# Patient Record
Sex: MALE | Race: OTHER | HISPANIC OR LATINO | URBAN - METROPOLITAN AREA
[De-identification: names, ages, dates, MRNs, and addresses within clinical notes are randomized per-mention and may not be internally consistent; named-entity substitution may affect disease eponyms.]

---

## 2022-08-01 ENCOUNTER — INPATIENT (INPATIENT)
Facility: HOSPITAL | Age: 32
LOS: 0 days | Discharge: HOME | End: 2022-08-02
Attending: INTERNAL MEDICINE | Admitting: INTERNAL MEDICINE

## 2022-08-01 VITALS
RESPIRATION RATE: 16 BRPM | OXYGEN SATURATION: 100 % | TEMPERATURE: 99 F | SYSTOLIC BLOOD PRESSURE: 95 MMHG | HEART RATE: 80 BPM | DIASTOLIC BLOOD PRESSURE: 80 MMHG

## 2022-08-01 PROCEDURE — 99285 EMERGENCY DEPT VISIT HI MDM: CPT

## 2022-08-01 NOTE — ED ADULT TRIAGE NOTE - CHIEF COMPLAINT QUOTE
severe LUQ abdominal radiating to RUQ today since 11am worse in the last two hours. Endorsing nausea. Appears uncomfortable in triage. Denies diarrhea, CP/SOB. Denies med hx

## 2022-08-02 VITALS — SYSTOLIC BLOOD PRESSURE: 112 MMHG | DIASTOLIC BLOOD PRESSURE: 62 MMHG

## 2022-08-02 DIAGNOSIS — K35.80 UNSPECIFIED ACUTE APPENDICITIS: ICD-10-CM

## 2022-08-02 LAB
ALBUMIN SERPL ELPH-MCNC: 4.9 G/DL — SIGNIFICANT CHANGE UP (ref 3.3–5)
ALP SERPL-CCNC: 90 U/L — SIGNIFICANT CHANGE UP (ref 40–120)
ALT FLD-CCNC: 11 U/L — SIGNIFICANT CHANGE UP (ref 4–41)
ANION GAP SERPL CALC-SCNC: 15 MMOL/L — HIGH (ref 7–14)
APPEARANCE UR: CLEAR — SIGNIFICANT CHANGE UP
APTT BLD: 28.6 SEC — SIGNIFICANT CHANGE UP (ref 27–36.3)
AST SERPL-CCNC: 17 U/L — SIGNIFICANT CHANGE UP (ref 4–40)
BASOPHILS # BLD AUTO: 0.04 K/UL — SIGNIFICANT CHANGE UP (ref 0–0.2)
BASOPHILS NFR BLD AUTO: 0.3 % — SIGNIFICANT CHANGE UP (ref 0–2)
BILIRUB SERPL-MCNC: 0.7 MG/DL — SIGNIFICANT CHANGE UP (ref 0.2–1.2)
BILIRUB UR-MCNC: NEGATIVE — SIGNIFICANT CHANGE UP
BLD GP AB SCN SERPL QL: NEGATIVE — SIGNIFICANT CHANGE UP
BUN SERPL-MCNC: 11 MG/DL — SIGNIFICANT CHANGE UP (ref 7–23)
CALCIUM SERPL-MCNC: 9.8 MG/DL — SIGNIFICANT CHANGE UP (ref 8.4–10.5)
CHLORIDE SERPL-SCNC: 98 MMOL/L — SIGNIFICANT CHANGE UP (ref 98–107)
CO2 SERPL-SCNC: 23 MMOL/L — SIGNIFICANT CHANGE UP (ref 22–31)
COLOR SPEC: YELLOW — SIGNIFICANT CHANGE UP
CREAT SERPL-MCNC: 1.15 MG/DL — SIGNIFICANT CHANGE UP (ref 0.5–1.3)
DIFF PNL FLD: NEGATIVE — SIGNIFICANT CHANGE UP
EGFR: 88 ML/MIN/1.73M2 — SIGNIFICANT CHANGE UP
EOSINOPHIL # BLD AUTO: 0.01 K/UL — SIGNIFICANT CHANGE UP (ref 0–0.5)
EOSINOPHIL NFR BLD AUTO: 0.1 % — SIGNIFICANT CHANGE UP (ref 0–6)
FLUAV AG NPH QL: SIGNIFICANT CHANGE UP
FLUBV AG NPH QL: SIGNIFICANT CHANGE UP
GLUCOSE SERPL-MCNC: 103 MG/DL — HIGH (ref 70–99)
GLUCOSE UR QL: NEGATIVE — SIGNIFICANT CHANGE UP
HCT VFR BLD CALC: 43.1 % — SIGNIFICANT CHANGE UP (ref 39–50)
HGB BLD-MCNC: 14.9 G/DL — SIGNIFICANT CHANGE UP (ref 13–17)
IANC: 12.36 K/UL — HIGH (ref 1.8–7.4)
IMM GRANULOCYTES NFR BLD AUTO: 0.4 % — SIGNIFICANT CHANGE UP (ref 0–1.5)
INR BLD: 1.07 RATIO — SIGNIFICANT CHANGE UP (ref 0.88–1.16)
KETONES UR-MCNC: ABNORMAL
LEUKOCYTE ESTERASE UR-ACNC: NEGATIVE — SIGNIFICANT CHANGE UP
LIDOCAIN IGE QN: 16 U/L — SIGNIFICANT CHANGE UP (ref 7–60)
LYMPHOCYTES # BLD AUTO: 0.91 K/UL — LOW (ref 1–3.3)
LYMPHOCYTES # BLD AUTO: 6.4 % — LOW (ref 13–44)
MCHC RBC-ENTMCNC: 29.9 PG — SIGNIFICANT CHANGE UP (ref 27–34)
MCHC RBC-ENTMCNC: 34.6 GM/DL — SIGNIFICANT CHANGE UP (ref 32–36)
MCV RBC AUTO: 86.5 FL — SIGNIFICANT CHANGE UP (ref 80–100)
MONOCYTES # BLD AUTO: 0.76 K/UL — SIGNIFICANT CHANGE UP (ref 0–0.9)
MONOCYTES NFR BLD AUTO: 5.4 % — SIGNIFICANT CHANGE UP (ref 2–14)
NEUTROPHILS # BLD AUTO: 12.36 K/UL — HIGH (ref 1.8–7.4)
NEUTROPHILS NFR BLD AUTO: 87.4 % — HIGH (ref 43–77)
NITRITE UR-MCNC: NEGATIVE — SIGNIFICANT CHANGE UP
NRBC # BLD: 0 /100 WBCS — SIGNIFICANT CHANGE UP
NRBC # FLD: 0 K/UL — SIGNIFICANT CHANGE UP
PH UR: 8.5 — HIGH (ref 5–8)
PLATELET # BLD AUTO: 206 K/UL — SIGNIFICANT CHANGE UP (ref 150–400)
POTASSIUM SERPL-MCNC: 3.9 MMOL/L — SIGNIFICANT CHANGE UP (ref 3.5–5.3)
POTASSIUM SERPL-SCNC: 3.9 MMOL/L — SIGNIFICANT CHANGE UP (ref 3.5–5.3)
PROT SERPL-MCNC: 7.8 G/DL — SIGNIFICANT CHANGE UP (ref 6–8.3)
PROT UR-MCNC: ABNORMAL
PROTHROM AB SERPL-ACNC: 12.4 SEC — SIGNIFICANT CHANGE UP (ref 10.5–13.4)
RBC # BLD: 4.98 M/UL — SIGNIFICANT CHANGE UP (ref 4.2–5.8)
RBC # FLD: 11.8 % — SIGNIFICANT CHANGE UP (ref 10.3–14.5)
RH IG SCN BLD-IMP: POSITIVE — SIGNIFICANT CHANGE UP
RSV RNA NPH QL NAA+NON-PROBE: SIGNIFICANT CHANGE UP
SARS-COV-2 RNA SPEC QL NAA+PROBE: SIGNIFICANT CHANGE UP
SODIUM SERPL-SCNC: 136 MMOL/L — SIGNIFICANT CHANGE UP (ref 135–145)
SP GR SPEC: 1.02 — SIGNIFICANT CHANGE UP (ref 1–1.05)
UROBILINOGEN FLD QL: SIGNIFICANT CHANGE UP
WBC # BLD: 14.14 K/UL — HIGH (ref 3.8–10.5)
WBC # FLD AUTO: 14.14 K/UL — HIGH (ref 3.8–10.5)

## 2022-08-02 PROCEDURE — 74176 CT ABD & PELVIS W/O CONTRAST: CPT | Mod: 26,MA

## 2022-08-02 PROCEDURE — 88304 TISSUE EXAM BY PATHOLOGIST: CPT | Mod: 26

## 2022-08-02 PROCEDURE — 44970 LAPAROSCOPY APPENDECTOMY: CPT

## 2022-08-02 DEVICE — STAPLER COVIDIEN TRI-STAPLE 45MM TAN RELOAD: Type: IMPLANTABLE DEVICE | Status: FUNCTIONAL

## 2022-08-02 RX ORDER — HYDROMORPHONE HYDROCHLORIDE 2 MG/ML
0.5 INJECTION INTRAMUSCULAR; INTRAVENOUS; SUBCUTANEOUS
Refills: 0 | Status: DISCONTINUED | OUTPATIENT
Start: 2022-08-02 | End: 2022-08-02

## 2022-08-02 RX ORDER — ACETAMINOPHEN 500 MG
2 TABLET ORAL
Qty: 0 | Refills: 0 | DISCHARGE

## 2022-08-02 RX ORDER — CEFOTETAN DISODIUM 1 G
2 VIAL (EA) INJECTION ONCE
Refills: 0 | Status: COMPLETED | OUTPATIENT
Start: 2022-08-02 | End: 2022-08-02

## 2022-08-02 RX ORDER — ONDANSETRON 8 MG/1
4 TABLET, FILM COATED ORAL ONCE
Refills: 0 | Status: DISCONTINUED | OUTPATIENT
Start: 2022-08-02 | End: 2022-08-02

## 2022-08-02 RX ORDER — HYDROMORPHONE HYDROCHLORIDE 2 MG/ML
0.5 INJECTION INTRAMUSCULAR; INTRAVENOUS; SUBCUTANEOUS EVERY 4 HOURS
Refills: 0 | Status: DISCONTINUED | OUTPATIENT
Start: 2022-08-02 | End: 2022-08-02

## 2022-08-02 RX ORDER — ENOXAPARIN SODIUM 100 MG/ML
40 INJECTION SUBCUTANEOUS EVERY 24 HOURS
Refills: 0 | Status: DISCONTINUED | OUTPATIENT
Start: 2022-08-02 | End: 2022-08-02

## 2022-08-02 RX ORDER — SODIUM CHLORIDE 9 MG/ML
1000 INJECTION INTRAMUSCULAR; INTRAVENOUS; SUBCUTANEOUS ONCE
Refills: 0 | Status: COMPLETED | OUTPATIENT
Start: 2022-08-02 | End: 2022-08-02

## 2022-08-02 RX ORDER — OXYCODONE HYDROCHLORIDE 5 MG/1
5 TABLET ORAL
Refills: 0 | Status: DISCONTINUED | OUTPATIENT
Start: 2022-08-02 | End: 2022-08-02

## 2022-08-02 RX ORDER — MORPHINE SULFATE 50 MG/1
2 CAPSULE, EXTENDED RELEASE ORAL ONCE
Refills: 0 | Status: DISCONTINUED | OUTPATIENT
Start: 2022-08-02 | End: 2022-08-02

## 2022-08-02 RX ORDER — SODIUM CHLORIDE 9 MG/ML
1000 INJECTION, SOLUTION INTRAVENOUS
Refills: 0 | Status: DISCONTINUED | OUTPATIENT
Start: 2022-08-02 | End: 2022-08-02

## 2022-08-02 RX ORDER — OXYCODONE HYDROCHLORIDE 5 MG/1
1 TABLET ORAL
Qty: 10 | Refills: 0
Start: 2022-08-02 | End: 2022-08-03

## 2022-08-02 RX ORDER — KETOROLAC TROMETHAMINE 30 MG/ML
15 SYRINGE (ML) INJECTION ONCE
Refills: 0 | Status: DISCONTINUED | OUTPATIENT
Start: 2022-08-02 | End: 2022-08-02

## 2022-08-02 RX ADMIN — SODIUM CHLORIDE 1000 MILLILITER(S): 9 INJECTION INTRAMUSCULAR; INTRAVENOUS; SUBCUTANEOUS at 02:10

## 2022-08-02 RX ADMIN — SODIUM CHLORIDE 100 MILLILITER(S): 9 INJECTION, SOLUTION INTRAVENOUS at 07:09

## 2022-08-02 RX ADMIN — ENOXAPARIN SODIUM 40 MILLIGRAM(S): 100 INJECTION SUBCUTANEOUS at 07:08

## 2022-08-02 RX ADMIN — Medication 15 MILLIGRAM(S): at 02:10

## 2022-08-02 RX ADMIN — Medication 100 GRAM(S): at 06:37

## 2022-08-02 NOTE — ED ADULT NURSE REASSESSMENT NOTE - NS ED NURSE REASSESS COMMENT FT1
Break RN note- patient resting quietly in bed, breathing even and nonlabored. No acute distress. Patient medicated as ordered. Awaiting CT. Safety maintained. Patient stable upon exiting the room.

## 2022-08-02 NOTE — ED PROVIDER NOTE - OBJECTIVE STATEMENT
31yM w/no stated pmhx presenting with adbominal pain. Pt states this morning he developed left sided upper/mid abdominal pain, worsening over the past 2 hours. Pt denies fever/chills, nausea, vomiting, diarrhea, cp, sob, palpitations, headache, dizziness, dysuria, urinary frequency, hematuria, recent travel or illness or any other concerns. 31yM w/no stated pmhx presenting with adbominal pain. Pt states this morning he developed left sided upper/mid abdominal pain, worsening over the past 2 hours. Pt denies fever/chills, nausea, vomiting, diarrhea, cp, sob, palpitations, headache, dizziness, dysuria, urinary frequency, hematuria, testicular pain or swelling, recent travel or illness or any other concerns.

## 2022-08-02 NOTE — ASU DISCHARGE PLAN (ADULT/PEDIATRIC) - CARE PROVIDER_API CALL
Toro Lange)  Surgery; Surgical Critical Care  [address], [address]  [place] [address]  Phone: [phone]  Fax: [phone]  Follow Up Time: 1 week

## 2022-08-02 NOTE — ASU DISCHARGE PLAN (ADULT/PEDIATRIC) - PAIN MANAGEMENT
[facility]/Take over the counter pain medication/Prescriptions electronically submitted to pharmacy from [facility]

## 2022-08-02 NOTE — BRIEF OPERATIVE NOTE - OPERATION/FINDINGS
Inflamed appendix. Thickened base. 45mm Tan load applied with slight bleeding at distal end of staple line. 5mm metallic clips applied.

## 2022-08-02 NOTE — ED ADULT NURSE NOTE - OBJECTIVE STATEMENT
Received patient with c/o abdominal pain with nausea but no vomiting. Patient states the pain comes and goes and its now 2/10 but was severe prior to coming to the ED. Patient is in no acute distress at the moment will continue to monitor pending further evaluation.

## 2022-08-02 NOTE — ED PROVIDER NOTE - ATTENDING APP SHARED VISIT CONTRIBUTION OF CARE
32 yo M with no PMH with one day of mid abdominal pain.  Started this morning and got suddenly worse 2 hours PTA and is severe and constant.  More left sided at this time.  No radiation.  There is no associated NVD, fever, hematuria, dysuria.  No similar pain int he past.      Vitals: I have reviewed the patients vital signs  General: nontoxic appearing  HEENT: Atraumatic, normocephalic, airway patent  Eyes: EOMI, tracking appropriately  Neck: no tracheal deviation  Abd: soft mild TTP L mid abdomen.  otherwise NT  Chest/Lungs: no trauma, symmetric chest rise, speaking in complete sentences,  no resp distress  Heart: skin and extremities well perfused, regular rate and rhythm  Neuro: A+Ox3, ambulating without difficulty, appears non focal  MSK: no deformities  Skin: no cyanosis, no jaundice     32 yo with L sided abd pain.  Differential includes but is not limited to renal colic, divertic, colitis, GB disease, appy.  Renal colic highest on differential.  Exam is less consistent with GB disease.  Will get imaging and labs while treating symptomatically.  Will dispo once results of imaging obtained

## 2022-08-02 NOTE — H&P ADULT - HISTORY OF PRESENT ILLNESS
HPI: 31yM with no significant PMH presenting with 1 day of abdominal pain. Pt states this yesterday morning he developed left sided sharp upper/mid abdominal pain. Pain worsened over the past 2 hours prompting his presentation to the ED. Denies prior episodes. Pt denies fever/chills, nausea, vomiting, diarrhea, cp, sob, palpitations, headache, dizziness, dysuria, urinary frequency, hematuria, testicular pain or swelling, recent travel or illness or any other concerns.      PAST MEDICAL & SURGICAL HISTORY:  No pertinent past medical history      No significant past surgical history          MEDICATIONS  (STANDING):  cefoTEtan  IVPB 2 Gram(s) IV Intermittent Once    MEDICATIONS  (PRN):      Allergies    No Known Allergies    Intolerances      SOCIAL HISTORY: Endorses social EtOH. Denies tobacco, other drug use      Physical Exam:  General: NAD, resting comfortably  HEENT: NC/AT, EOMI, normal hearing, no oral lesions, no LAD, neck supple  Pulmonary: normal resp effort on RA  Cardiovascular: NSR  Abdominal: soft, nondistended, tender to palpation in bilateral lower quadrants. + Rovsing's sign  Extremities: WWP, normal strength, no clubbing/cyanosis/edema  Neuro: A/O x 3, CNs II-XII grossly intact, normal sensation, no focal deficits  Psych: Affect appropriate  Pulses: palpable distal pulses    Vital Signs Last 24 Hrs  T(C): 37.1 (01 Aug 2022 23:40), Max: 37.1 (01 Aug 2022 23:40)  T(F): 98.8 (01 Aug 2022 23:40), Max: 98.8 (01 Aug 2022 23:40)  HR: 80 (01 Aug 2022 23:40) (80 - 80)  BP: 95/80 (01 Aug 2022 23:40) (95/80 - 95/80)  BP(mean): --  RR: 16 (01 Aug 2022 23:40) (16 - 16)  SpO2: 100% (01 Aug 2022 23:40) (100% - 100%)    Parameters below as of 01 Aug 2022 23:40  Patient On (Oxygen Delivery Method): room air      I&O's Summary    LABS:                        14.9   14.14 )-----------( 206      ( 02 Aug 2022 01:05 )             43.1     08-02    136  |  98  |  11  ----------------------------<  103<H>  3.9   |  23  |  1.15    Ca    9.8      02 Aug 2022 01:05    TPro  7.8  /  Alb  4.9  /  TBili  0.7  /  DBili  x   /  AST  17  /  ALT  11  /  AlkPhos  90  08-      Urinalysis Basic - ( 02 Aug 2022 03:15 )    Color: Yellow / Appearance: Clear / S.018 / pH: x  Gluc: x / Ketone: Small  / Bili: Negative / Urobili: <2 mg/dL   Blood: x / Protein: Trace / Nitrite: Negative   Leuk Esterase: Negative / RBC: x / WBC x   Sq Epi: x / Non Sq Epi: x / Bacteria: x      CAPILLARY BLOOD GLUCOSE        LIVER FUNCTIONS - ( 02 Aug 2022 01:05 )  Alb: 4.9 g/dL / Pro: 7.8 g/dL / ALK PHOS: 90 U/L / ALT: 11 U/L / AST: 17 U/L / GGT: x             RADIOLOGY & ADDITIONAL STUDIES:  < from: CT Abdomen and Pelvis No Cont (22 @ 04:06) >    FINDINGS: Absence of intravenous contrast limits evaluation of   vasculature and solid organs.    LOWER CHEST: Within normal limits.    LIVER: Within normal limits.  BILE DUCTS: Normal caliber.  GALLBLADDER: Cholelithiasis.  SPLEEN: Within normal limits.  PANCREAS: Within normal limits.  ADRENALS: Within normal limits.  KIDNEYS/URETERS: Within normal limits.    BLADDER: Mild wall thickening, difficult to assess secondary to   inadequate distention.  REPRODUCTIVE ORGANS: Prostate within normal limits.    BOWEL: No bowel obstruction. Appendix is diffusely dilated measuring   nearly 1.5 cm with wall thickening and periappendiceal stranding,   compatible with acute appendicitis.  PERITONEUM: No ascites.  VESSELS:  Within normal limits.  RETROPERITONEUM: No lymphadenopathy.  ABDOMINAL WALL: Within normal limits.  BONES: Within normal limits.    IMPRESSION:    Findings compatible with acute uncomplicated appendicitis. No perforation   or periappendiceal abscess.    Cholelithiasis.      < end of copied text >    ASSESSMENT/PLAN: 31yM with no significant PMH presenting with 1 day of abdominal pain, found to leukocytosis and CT findings consistent with acute appendicitis.     - Admit to B Team Surgery  - NPO/IVF  - IV Abx  - Added on to OR for laparoscopic appendectomy  - Pain control PRN    Patient discussed with attending, Dr. Lange.     Tammi Giles MD  PGY3 Consult Resident  B Team Surgery (Acute Care Surgery)  [id number]

## 2022-08-02 NOTE — ED PROVIDER NOTE - PROGRESS NOTE DETAILS
ERMELINDA Farley: Pt now reports improvement in pain (has not yet receieved pain medication), given pain is colicky in nature, CT abd/pelvis to look for kidney stone. ERMELINDA Farley: Radiology called, CT showing acute appendicitis, paged surgery. ERMELINDA Farley: Spoke with surgery will see pt in ED. Requesting cefotetan ERMELINDA Farley: Pt seen by surgery, admit to , discussed with surgery resident

## 2022-08-02 NOTE — ED PROVIDER NOTE - CLINICAL SUMMARY MEDICAL DECISION MAKING FREE TEXT BOX
31yM w/no stated pmhx presenting with abdominal pain. Pt states this morning he developed left sided upper/mid abdominal pain, worsening over the past 2 hours. On exam pt appears uncomfortable, afebrile, +ttp periumbilical. Concern for appy, kidney stone, gastritis, no RUQ ttp to suggest biliary pathology. Plan: cbc/cmp/lipase, ua/ucx, CT abd/pelvis, pain control, IV fluids 31yM w/no stated pmhx presenting with abdominal pain. Pt states this morning he developed left sided upper/mid abdominal pain, worsening over the past 2 hours. On exam pt appears uncomfortable, afebrile, +ttp periumbilical. Concern forkidney stone, gastritis, no RUQ ttp to suggest biliary pathology, no RLQ ttp to suggest appy. Plan: cbc/cmp/lipase, ua/ucx, CT abd/pelvis, pain control, IV fluids

## 2022-08-02 NOTE — ED ADULT NURSE REASSESSMENT NOTE - NS ED NURSE REASSESS COMMENT FT1
Pt in NAD, report on pt given to OR FIDE Buenrostro. Pt breathing even and unlabored, denies abd pain at present time, abd soft, non-tender, non-distended, bowel sounds active all 4 quadrants, no n/v/d, afebrile, ivl is clear and patent, receiving maintenance IVF. will continue to monitor.

## 2022-08-02 NOTE — ASU DISCHARGE PLAN (ADULT/PEDIATRIC) - NURSING INSTRUCTIONS
DO NOT take any Tylenol (Acetaminophen) or narcotics containing Tylenol until after  4.15 . You received Tylenol during your operation and it can cause damage to your liver if too much is taken within a 24 hour time period.

## 2022-08-02 NOTE — ASU DISCHARGE PLAN (ADULT/PEDIATRIC) - NS MD DC FALL RISK RISK
For information on Fall & Injury Prevention, visit: [email or web address] OR  [email or web address] OR  https://www.cdc.gov/steadi/patient.html

## 2022-08-02 NOTE — ED PROVIDER NOTE - NS ED ATTENDING STATEMENT MOD
This was a shared visit with the MAL. I reviewed and verified the documentation and independently performed the documented:

## 2022-08-02 NOTE — H&P ADULT - ATTENDING COMMENTS
I saw and examined the patient and agree with the above note.    K35.33 Appendicitis, acute, with peritonitis   -OR planning for laparoscopic appendectomy  -Pre-op abx, resuscitation and labs  -NPO/IVF  -Pain control via IV meds    I spent 45min reviewing data, images and documentation as well as in care coordination.  Greater than 50% of my time was spent in discussion regarding pre- and post-operative care as well as risk, benefits and alternatives to operative intervention.    Toro Lange MD   Acute and Critical Care Surgery  (Cell: [phone])  Email: [email or web address]    The Acute Care Surgery (B Team) Attending Group Practice:  Dr. Ruddy Esposito, Dr. Wilner Rizo, Dr. Toro Lange,  Dr. Keenan Man and Dr. Tomeka Navarrete     Urgent issues - spectra [phone] or [id number]  Nonurgent issues - [phone]  Patient appointments or after hours - [phone]

## 2022-08-03 LAB
CULTURE RESULTS: SIGNIFICANT CHANGE UP
SPECIMEN SOURCE: SIGNIFICANT CHANGE UP

## 2022-08-09 LAB — SURGICAL PATHOLOGY STUDY: SIGNIFICANT CHANGE UP

## 2022-10-31 NOTE — ED PROVIDER NOTE - PROGRESS NOTE ADDITIONAL2

## 2023-11-27 NOTE — ED PROVIDER NOTE - MDM ORDERS SUBMITTED SELECTION
Left message to return call.  
PT CALLED STATING HE TOOK HIS OZEMPIC RX YESTERDAY AND HE WAS NAUSEATED. HE REQUESTED A CALL BACK TO CONSULT.   
Patient notified and verbalized understanding.  He gets Ozempic from patient assistance.  New paperwork filled out and placed in Dr. Vidales box for signature.  
Please call pt back he was returning our call  [phone]  
Spoke with patient.  He states that every time that he takes the Ozempic and for the whole week he either experiences nausea or loose stools/diarrhea.    
Labs/Imaging Studies/Medications

## (undated) DEVICE — TUBING INSUFFLATION LAP FILTER 10FT

## (undated) DEVICE — DRSG BENZOIN 0.6CC

## (undated) DEVICE — ENDOCATCH 10MM SPECIMEN POUCH

## (undated) DEVICE — DRSG STERISTRIPS 0.5 X 4"

## (undated) DEVICE — DRSG TELFA 3 X 8

## (undated) DEVICE — SOL IRR POUR NS 0.9% 500ML

## (undated) DEVICE — VENODYNE/SCD SLEEVE CALF MEDIUM

## (undated) DEVICE — SOL IRR POUR H2O 500ML

## (undated) DEVICE — SUT MONOCRYL 4-0 27" PS-2 UNDYED

## (undated) DEVICE — CANISTER DISPOSABLE THIN WALL 3000CC

## (undated) DEVICE — LIGASURE MARYLAND 37CM

## (undated) DEVICE — TROCAR COVIDIEN VERSAPORT BLADELESS OPTICAL 5MM STANDARD

## (undated) DEVICE — PROTECTOR HEEL / ELBOW FLUFFY

## (undated) DEVICE — LIGASURE IMPACT

## (undated) DEVICE — GLV 7.5 PROTEXIS (WHITE)

## (undated) DEVICE — TIP METZENBAUM SCISSOR MONOPOLAR ENDOCUT (ORANGE)

## (undated) DEVICE — TUBING HYDRO-SURG PLUS IRRIGATOR W SMOKEVAC & PROBE

## (undated) DEVICE — DRSG TEGADERM 2.5X3"

## (undated) DEVICE — BASIN SET SINGLE

## (undated) DEVICE — STAPLER COVIDIEN ENDO GIA STANDARD HANDLE

## (undated) DEVICE — DISSECTOR ENDOSCOPIC KITTNER SINGLE TIP

## (undated) DEVICE — ELCTR GROUNDING PAD ADULT COVIDIEN

## (undated) DEVICE — GLV 7.5 PROTEXIS (CREAM) MICRO

## (undated) DEVICE — WARMING BLANKET FULL ADULT

## (undated) DEVICE — SUT VICRYL 0 27" UR-6

## (undated) DEVICE — TUBING OLYMPUS INSUFFLATION

## (undated) DEVICE — TROCAR COVIDIEN BLUNT TIP HASSAN 12MM

## (undated) DEVICE — POSITIONER STRAP ARMBOARD VELCRO TS-30

## (undated) DEVICE — PACK GENERAL LAPAROSCOPY

## (undated) DEVICE — D HELP - CLEARVIEW CLEARIFY SYSTEM

## (undated) DEVICE — BLADE SURGICAL #15 CARBON